# Patient Record
Sex: MALE | Race: WHITE | URBAN - METROPOLITAN AREA
[De-identification: names, ages, dates, MRNs, and addresses within clinical notes are randomized per-mention and may not be internally consistent; named-entity substitution may affect disease eponyms.]

---

## 2024-05-13 ENCOUNTER — TELEMEDICINE (OUTPATIENT)
Dept: PRIMARY CARE CLINIC | Age: 34
End: 2024-05-13
Payer: COMMERCIAL

## 2024-05-13 DIAGNOSIS — R41.840 INATTENTION: ICD-10-CM

## 2024-05-13 DIAGNOSIS — R45.89 DEPRESSED MOOD: Primary | ICD-10-CM

## 2024-05-13 PROCEDURE — 99213 OFFICE O/P EST LOW 20 MIN: CPT | Performed by: FAMILY MEDICINE

## 2024-05-13 RX ORDER — LORAZEPAM 0.5 MG/1
0.5 TABLET ORAL DAILY PRN
COMMUNITY
Start: 2024-04-26

## 2024-05-13 SDOH — ECONOMIC STABILITY: FOOD INSECURITY: WITHIN THE PAST 12 MONTHS, THE FOOD YOU BOUGHT JUST DIDN'T LAST AND YOU DIDN'T HAVE MONEY TO GET MORE.: NEVER TRUE

## 2024-05-13 SDOH — ECONOMIC STABILITY: HOUSING INSECURITY
IN THE LAST 12 MONTHS, WAS THERE A TIME WHEN YOU DID NOT HAVE A STEADY PLACE TO SLEEP OR SLEPT IN A SHELTER (INCLUDING NOW)?: NO

## 2024-05-13 SDOH — ECONOMIC STABILITY: INCOME INSECURITY: HOW HARD IS IT FOR YOU TO PAY FOR THE VERY BASICS LIKE FOOD, HOUSING, MEDICAL CARE, AND HEATING?: NOT HARD AT ALL

## 2024-05-13 SDOH — ECONOMIC STABILITY: FOOD INSECURITY: WITHIN THE PAST 12 MONTHS, YOU WORRIED THAT YOUR FOOD WOULD RUN OUT BEFORE YOU GOT MONEY TO BUY MORE.: NEVER TRUE

## 2024-05-13 ASSESSMENT — PATIENT HEALTH QUESTIONNAIRE - PHQ9
SUM OF ALL RESPONSES TO PHQ QUESTIONS 1-9: 0
SUM OF ALL RESPONSES TO PHQ QUESTIONS 1-9: 0
1. LITTLE INTEREST OR PLEASURE IN DOING THINGS: NOT AT ALL
SUM OF ALL RESPONSES TO PHQ QUESTIONS 1-9: 0
SUM OF ALL RESPONSES TO PHQ9 QUESTIONS 1 & 2: 0
SUM OF ALL RESPONSES TO PHQ QUESTIONS 1-9: 0
2. FEELING DOWN, DEPRESSED OR HOPELESS: NOT AT ALL

## 2024-05-13 NOTE — PROGRESS NOTES
Joseph Weber, was evaluated through a synchronous (real-time) audio-video encounter. The patient (or guardian if applicable) is aware that this is a billable service, which includes applicable co-pays. This Virtual Visit was conducted with patient's (and/or legal guardian's) consent. Patient identification was verified, and a caregiver was present when appropriate.   The patient was located at Home: [address] [place] [id number]  Provider was located at Facility (Appt Dept): [address] [place] [id number]  Confirm you are appropriately licensed, registered, or certified to deliver care in the state where the patient is located as indicated above. If you are not or unsure, please re-schedule the visit: Yes, I confirm.     Joseph Weber (:  1990) is a Established patient, presenting virtually for evaluation of the following:    Assessment & Plan   Below is the assessment and plan developed based on review of pertinent history, physical exam, labs, studies, and medications.  1. Depressed mood - managed by psychiatry. Sounds like medications are working well. I don't have any recommendations for med modification at this time.   2. Inattention - discussed with patient that workup starts with eval/dx from mental health - whether that be from psychiatrist or psychologist at [facility]. And typically psychiatry would take the management of this as it does all work together with other mood d/o conditions. Patient understanding and agreeable.     No follow-ups on file.       Subjective     Depression - patient going through a divorce. Has been seeing psychiatry. Prescribed zoloft and prn ativan. (Darshan Mcleod - [facility] NP). Started seeing psychiatry about 1 month ago. Was seeing counselor there first then switched to seeing psychiatry about 4-6 weeks ago. He is actually only taking 25mg of zoloft right now (half tablet). Initially he was taking ativan pretty often, but now hasn't

## 2024-05-13 NOTE — PATIENT INSTRUCTIONS
Psychology Recommendations    [facility]    [phone]  [address] [place] [address]  [email or web address]   [phone] [address] [facility], [address], [place] [address]    [facility] [phone]  [address] [facility] [address] [place] [address]    [facility]  [phone]  [address] [place] [address] (Multiple locations)    [facility]  [phone] [address] [facility] [address]